# Patient Record
Sex: MALE | Race: BLACK OR AFRICAN AMERICAN
[De-identification: names, ages, dates, MRNs, and addresses within clinical notes are randomized per-mention and may not be internally consistent; named-entity substitution may affect disease eponyms.]

---

## 2017-04-07 ENCOUNTER — HOSPITAL ENCOUNTER (OUTPATIENT)
Dept: HOSPITAL 62 - OD | Age: 57
End: 2017-04-07
Attending: PSYCHIATRY & NEUROLOGY
Payer: COMMERCIAL

## 2017-04-07 DIAGNOSIS — Z79.899: ICD-10-CM

## 2017-04-07 DIAGNOSIS — G40.89: Primary | ICD-10-CM

## 2017-04-07 DIAGNOSIS — R51: ICD-10-CM

## 2017-04-07 LAB
ALBUMIN SERPL-MCNC: 4.4 G/DL (ref 3.5–5)
ALP SERPL-CCNC: 102 U/L (ref 38–126)
ALT SERPL-CCNC: 60 U/L (ref 21–72)
ANION GAP SERPL CALC-SCNC: 13 MMOL/L (ref 5–19)
AST SERPL-CCNC: 39 U/L (ref 17–59)
BILIRUB DIRECT SERPL-MCNC: 0.2 MG/DL (ref 0–0.4)
BILIRUB SERPL-MCNC: 0.5 MG/DL (ref 0.2–1.3)
BUN SERPL-MCNC: 15 MG/DL (ref 7–20)
CALCIUM: 9.5 MG/DL (ref 8.4–10.2)
CHLORIDE SERPL-SCNC: 105 MMOL/L (ref 98–107)
CHOLEST SERPL-MCNC: 221.46 MG/DL (ref 0–200)
CO2 SERPL-SCNC: 28 MMOL/L (ref 22–30)
CREAT SERPL-MCNC: 1.09 MG/DL (ref 0.52–1.25)
DIRECT HDL: 66 MG/DL (ref 40–?)
ERYTHROCYTE [DISTWIDTH] IN BLOOD BY AUTOMATED COUNT: 14.5 % (ref 11.5–14)
GLUCOSE SERPL-MCNC: 88 MG/DL (ref 75–110)
HCT VFR BLD CALC: 41.9 % (ref 37.9–51)
HGB BLD-MCNC: 13.8 G/DL (ref 13.5–17)
HGB HCT DIFFERENCE: -0.5
LDLC SERPL DIRECT ASSAY-MCNC: 117 MG/DL (ref ?–100)
MCH RBC QN AUTO: 29 PG (ref 27–33.4)
MCHC RBC AUTO-ENTMCNC: 33 G/DL (ref 32–36)
MCV RBC AUTO: 88 FL (ref 80–97)
POTASSIUM SERPL-SCNC: 4.5 MMOL/L (ref 3.6–5)
PROT SERPL-MCNC: 7.5 G/DL (ref 6.3–8.2)
RBC # BLD AUTO: 4.76 10^6/UL (ref 4.35–5.55)
SODIUM SERPL-SCNC: 145.8 MMOL/L (ref 137–145)
TRIGL SERPL-MCNC: 81 MG/DL (ref ?–150)
VLDLC SERPL CALC-MCNC: 16 MG/DL (ref 10–31)
WBC # BLD AUTO: 8.4 10^3/UL (ref 4–10.5)

## 2017-04-07 PROCEDURE — 80061 LIPID PANEL: CPT

## 2017-04-07 PROCEDURE — 80053 COMPREHEN METABOLIC PANEL: CPT

## 2017-04-07 PROCEDURE — 85027 COMPLETE CBC AUTOMATED: CPT

## 2017-04-07 PROCEDURE — 36415 COLL VENOUS BLD VENIPUNCTURE: CPT

## 2018-01-23 ENCOUNTER — HOSPITAL ENCOUNTER (EMERGENCY)
Dept: HOSPITAL 62 - ER | Age: 58
Discharge: HOME | End: 2018-01-23
Payer: COMMERCIAL

## 2018-01-23 VITALS — SYSTOLIC BLOOD PRESSURE: 109 MMHG | DIASTOLIC BLOOD PRESSURE: 84 MMHG

## 2018-01-23 DIAGNOSIS — R05: ICD-10-CM

## 2018-01-23 DIAGNOSIS — R09.89: ICD-10-CM

## 2018-01-23 DIAGNOSIS — J40: Primary | ICD-10-CM

## 2018-01-23 LAB
A TYPE INFLUENZA AG: NEGATIVE
ADD MANUAL DIFF: NO
ALBUMIN SERPL-MCNC: 4.8 G/DL (ref 3.5–5)
ALP SERPL-CCNC: 114 U/L (ref 38–126)
ALT SERPL-CCNC: 43 U/L (ref 21–72)
ANION GAP SERPL CALC-SCNC: 14 MMOL/L (ref 5–19)
APPEARANCE UR: CLEAR
APTT PPP: YELLOW S
AST SERPL-CCNC: 34 U/L (ref 17–59)
B INFLUENZA AG: NEGATIVE
BASOPHILS # BLD AUTO: 0.1 10^3/UL (ref 0–0.2)
BASOPHILS NFR BLD AUTO: 1.2 % (ref 0–2)
BILIRUB DIRECT SERPL-MCNC: 0.2 MG/DL (ref 0–0.4)
BILIRUB SERPL-MCNC: 0.3 MG/DL (ref 0.2–1.3)
BILIRUB UR QL STRIP: NEGATIVE
BUN SERPL-MCNC: 11 MG/DL (ref 7–20)
CALCIUM: 9.4 MG/DL (ref 8.4–10.2)
CHLORIDE SERPL-SCNC: 102 MMOL/L (ref 98–107)
CK MB SERPL-MCNC: 0.79 NG/ML (ref ?–4.55)
CK SERPL-CCNC: 902 U/L (ref 55–170)
CO2 SERPL-SCNC: 25 MMOL/L (ref 22–30)
EOSINOPHIL # BLD AUTO: 0.1 10^3/UL (ref 0–0.6)
EOSINOPHIL NFR BLD AUTO: 1.3 % (ref 0–6)
ERYTHROCYTE [DISTWIDTH] IN BLOOD BY AUTOMATED COUNT: 14.2 % (ref 11.5–14)
GLUCOSE SERPL-MCNC: 98 MG/DL (ref 75–110)
GLUCOSE UR STRIP-MCNC: NEGATIVE MG/DL
HCT VFR BLD CALC: 43.1 % (ref 37.9–51)
HGB BLD-MCNC: 14.4 G/DL (ref 13.5–17)
KETONES UR STRIP-MCNC: NEGATIVE MG/DL
LYMPHOCYTES # BLD AUTO: 1.5 10^3/UL (ref 0.5–4.7)
LYMPHOCYTES NFR BLD AUTO: 23.2 % (ref 13–45)
MCH RBC QN AUTO: 29.3 PG (ref 27–33.4)
MCHC RBC AUTO-ENTMCNC: 33.4 G/DL (ref 32–36)
MCV RBC AUTO: 88 FL (ref 80–97)
MONOCYTES # BLD AUTO: 0.8 10^3/UL (ref 0.1–1.4)
MONOCYTES NFR BLD AUTO: 13.1 % (ref 3–13)
NEUTROPHILS # BLD AUTO: 3.9 10^3/UL (ref 1.7–8.2)
NEUTS SEG NFR BLD AUTO: 61.2 % (ref 42–78)
NITRITE UR QL STRIP: NEGATIVE
PH UR STRIP: 7 [PH] (ref 5–9)
PLATELET # BLD: 176 10^3/UL (ref 150–450)
POTASSIUM SERPL-SCNC: 4.3 MMOL/L (ref 3.6–5)
PROT SERPL-MCNC: 7.9 G/DL (ref 6.3–8.2)
PROT UR STRIP-MCNC: NEGATIVE MG/DL
RBC # BLD AUTO: 4.91 10^6/UL (ref 4.35–5.55)
SODIUM SERPL-SCNC: 140.5 MMOL/L (ref 137–145)
SP GR UR STRIP: 1.02
TOTAL CELLS COUNTED % (AUTO): 100 %
TROPONIN I SERPL-MCNC: < 0.012 NG/ML
UROBILINOGEN UR-MCNC: NEGATIVE MG/DL (ref ?–2)
WBC # BLD AUTO: 6.3 10^3/UL (ref 4–10.5)

## 2018-01-23 PROCEDURE — 84484 ASSAY OF TROPONIN QUANT: CPT

## 2018-01-23 PROCEDURE — 81001 URINALYSIS AUTO W/SCOPE: CPT

## 2018-01-23 PROCEDURE — 94640 AIRWAY INHALATION TREATMENT: CPT

## 2018-01-23 PROCEDURE — 93010 ELECTROCARDIOGRAM REPORT: CPT

## 2018-01-23 PROCEDURE — 82550 ASSAY OF CK (CPK): CPT

## 2018-01-23 PROCEDURE — 71046 X-RAY EXAM CHEST 2 VIEWS: CPT

## 2018-01-23 PROCEDURE — 99284 EMERGENCY DEPT VISIT MOD MDM: CPT

## 2018-01-23 PROCEDURE — 82553 CREATINE MB FRACTION: CPT

## 2018-01-23 PROCEDURE — 85025 COMPLETE CBC W/AUTO DIFF WBC: CPT

## 2018-01-23 PROCEDURE — 87804 INFLUENZA ASSAY W/OPTIC: CPT

## 2018-01-23 PROCEDURE — 93005 ELECTROCARDIOGRAM TRACING: CPT

## 2018-01-23 PROCEDURE — 36415 COLL VENOUS BLD VENIPUNCTURE: CPT

## 2018-01-23 PROCEDURE — 80053 COMPREHEN METABOLIC PANEL: CPT

## 2018-01-23 NOTE — RADIOLOGY REPORT (SQ)
EXAM DESCRIPTION:  CHEST PA/LAT



COMPLETED DATE/TIME:  1/23/2018 11:48 am



REASON FOR STUDY:  cp/cough



COMPARISON:  1/29/2016



NUMBER OF VIEWS:  Two view.



TECHNIQUE:  Frontal and lateral radiographic views of the chest acquired.



LIMITATIONS:  None.



FINDINGS:  LUNGS AND PLEURA:  No opacities, masses or pneumothorax. No pleural effusion.

MEDIASTINUM AND HILAR STRUCTURES:  No masses or contour abnormalities.

HEART AND VASCULATURE:  Heart normal size.  No evidence for failure.

BONY STRUCTURES:  No acute findings.

HARDWARE:  None.

OTHER:  No other significant finding.



IMPRESSION:  NO SIGNIFICANT RADIOGRAPHIC FINDING IN THE CHEST.



TECHNICAL DOCUMENTATION:  JOB ID:  [id number]

 2011 [employer]- All Rights Reserved

## 2018-01-23 NOTE — ER DOCUMENT REPORT
ED General





- General


Chief Complaint: Chest Congestion


Stated Complaint: COLD SYMPTOMS


Time Seen by Provider: 01/23/18 10:51


Mode of Arrival: Ambulatory


Information source: Patient


Notes: 





57-year-old male presents with complaints of nonproductive cough of 3 day 

duration.  Patient denies any fevers or chills denies any nausea vomiting or 

diarrhea.  Patient notes that the symptoms are similar to previous viral 

infections in the past.  He denies any nausea vomiting or diarrhea


TRAVEL OUTSIDE OF THE [place] IN LAST 30 DAYS: No





- HPI


Onset: Other - 3 day duration


Onset/Duration: Persistent


Quality of pain: Achy


Severity: Mild


Pain Level: 1


Associated symptoms: Nonproductive cough


Exacerbated by: Denies


Relieved by: Denies


Similar symptoms previously: No


Recently seen / treated by doctor: No





- Related Data


Allergies/Adverse Reactions: 


 





No Known Allergies Allergy (Verified 12/03/15 11:27)


 











Past Medical History





- General


Information source: Patient





- Social History


Smoking Status: Never Smoker


Cigarette use (# per day): No


Chew tobacco use (# tins/day): No


Smoking Education Provided: No


Frequency of alcohol use: None


Drug Abuse: None


Family History: None


Patient has suicidal ideation: No


Patient has homicidal ideation: No





- Past Medical History


Cardiac Medical History: Reports: Hx Hypertension


Neurological Medical History: Reports: Hx Migraine, Hx Seizures


Renal/ Medical History: Denies: Hx Peritoneal Dialysis





- Immunizations


Hx Diphtheria, Pertussis, Tetanus Vaccination: Yes





Review of Systems





- Review of Systems


Notes: 





REVIEW OF SYSTEMS:


CONSTITUTIONAL :  Denies fever,  chills, or sweats.  Denies recent illness.


EENT:   Denies eye, ear, throat, or mouth pain or symptoms.  Denies nasal or 

sinus congestion or discharge.  Denies throat, tongue, or mouth swelling or 

difficulty swallowing.


CARDIOVASCULAR:  Denies chest pain.  Denies palpitations or racing or irregular 

heart beat.  Denies ankle edema.


RESPIRATORY: Admits to cough congestion


GASTROINTESTINAL:  Denies abdominal pain or distention.  Denies nausea, vomiting

, or diarrhea.  Denies blood in vomitus, stools, or per rectum.  Denies black, 

tarry stools.  Denies constipation.  


GENITOURINARY:  Denies difficulty urinating, painful urination, burning, 

frequency, blood in urine, or discharge.


MUSCULOSKELETAL:  Denies back or neck pain or stiffness.  Denies joint pain or 

swelling.


SKIN:   Denies rash, lesions or sores.


HEMATOLOGIC :   Denies easy bruising or bleeding.


LYMPHATIC:  Denies swollen, enlarged glands.


NEUROLOGICAL:  Denies confusion or altered mental status.  Denies passing out 

or loss of consciousness.  Denies dizziness or lightheadedness.  Denies 

headache.  Denies weakness or paralysis or loss of use of either side.  Denies 

problems with gait or speech.  Denies sensory loss, numbness, or tingling.  

Denies seizures.


PSYCHIATRIC:  Denies anxiety or stress.  Denies depression, suicidal ideation, 

or homicidal ideation.





ALL OTHER SYSTEMS REVIEWED AND NEGATIVE.





Dictation was performed using Dragon voice recognition software 





PHYSICAL EXAMINATION:





GENERAL: Well-appearing, well-nourished and in no acute distress.





HEAD: Atraumatic, normocephalic.





EYES: Pupils equal round and reactive to light, extraocular movements intact, 

sclera anicteric, conjunctiva are normal.





ENT: Nares patent, oropharynx clear without exudates.  Moist mucous membranes.





NECK: Normal range of motion, supple without lymphadenopathy





LUNGS: Breath sounds clear to auscultation bilaterally and equal.  No wheezes 

rales or rhonchi.





HEART: Regular rate and rhythm without murmurs





ABDOMEN: Soft, nontender, nondistended abdomen.  No guarding, no rebound.  No 

masses appreciated.





Musculoskeletal: Normal range of motion, no pitting or edema.  No cyanosis.





NEUROLOGICAL: Cranial nerves grossly intact.  Normal speech, normal gait.  

Normal sensory, motor exams 





PSYCH: Normal mood, normal affect.





SKIN: Warm, Dry, normal turgor, no rashes or lesions noted.





Physical Exam





- Vital signs


Vitals: 


 











Temp Pulse Resp BP Pulse Ox


 


 99.4 F   96   18   121/82   98 


 


 01/23/18 09:44  01/23/18 09:44  01/23/18 09:44  01/23/18 09:44  01/23/18 09:44














Course





- Re-evaluation


Re-evalutation: 





01/23/18 15:11


X-ray lab work noted no significant abnormality patient looks extremely well, I 

will discharge the patient home after breathing treatment was given, patient 

notes the breathing treatment did make him feel much better therefore I will 

sent home with an inhaler








After performing a Medical Screening Examination, I estimate there is LOW risk 

for ACUTE CORONARY SYNDROME, PULMONARY EMBOLI, RESPIRATORY FAILURE, SEPSIS OR 

MENINGITIS, thus I consider the discharge disposition reasonable.  I have 

reevaluated this patient multiple times and no significant life threatening 

changes are noted. The patient and I have discussed the diagnosis and risks, 

and we agree with discharging home with close follow-up. We also discussed 

returning to the Emergency Department immediately if new or worsening symptoms 

occur. We have discussed the symptoms which are most concerning (e.g., changing 

or worsening pain, trouble swallowing or breathing, neck stiffness, fever) that 

necessitate immediate return.





- Vital Signs


Vital signs: 


 











Temp Pulse Resp BP Pulse Ox


 


 98.5 F   104 H  18   109/84   93 


 


 01/23/18 13:40  01/23/18 13:40  01/23/18 13:40  01/23/18 13:40  01/23/18 13:40














- Laboratory


Result Diagrams: 


 01/23/18 11:12





 01/23/18 11:12


Laboratory results interpreted by me: 


 











  01/23/18 01/23/18





  11:12 11:12


 


RDW  14.2 H 


 


Monocytes %  13.1 H 


 


Creatinine   1.31 H


 


Est GFR (Non-Af Amer)   57 L


 


Creatine Kinase   902 H














- Diagnostic Test


Radiology reviewed: Image reviewed, Reports reviewed





Discharge





- Discharge


Clinical Impression: 


 Bronchitis





Condition: Stable


Disposition: HOME, SELF-CARE


Instructions:  Bronchitis (Count includes the [facility])


Additional Instructions: 


Follow up with your physician tomorrow for further care or return to the ED 

IMMEDIATELY if symptoms worsen or new concerns occur. If you cannot afford to 

follow up with your primary care physician a list of low cost clinics have been 

provided at the end of your discharge papers as well.

## 2018-01-23 NOTE — ER DOCUMENT REPORT
ED Medical Screen (RME)





- General


Chief Complaint: Chest Congestion


Stated Complaint: COLD SYMPTOMS


Time Seen by Provider: 01/23/18 10:51


Mode of Arrival: Ambulatory


Information source: Patient


Notes: 





Patient is a 57-year-old male who presents to the ER today for cough, 

congestion 3 days and chest pain for approximately half an hour earlier today.

  Patient states that he did not have any coughing when he had the chest pain.  

Patient has no cardiac history.


TRAVEL OUTSIDE OF THE [place] IN LAST 30 DAYS: No





- Related Data


Allergies/Adverse Reactions: 


 





No Known Allergies Allergy (Verified 12/03/15 11:27)


 











Past Medical History





- General


Information source: Patient





- Past Medical History


Cardiac Medical History: Reports: Hx Hypertension


Neurological Medical History: Reports: Hx Migraine, Hx Seizures





- Immunizations


Hx Diphtheria, Pertussis, Tetanus Vaccination: Yes





Review of Systems





- Review of Systems


EENT: See HPI


Respiratory: See HPI





Physical Exam





- Vital signs


Vitals: 





 











Temp Pulse Resp BP Pulse Ox


 


 99.4 F   96   18   121/82   98 


 


 01/23/18 09:44  01/23/18 09:44  01/23/18 09:44  01/23/18 09:44  01/23/18 09:44














- Notes


Notes: 





General: Mildly ill-appearing no acute distress


Respiratory: Cough, otherwise clear to auscultation bilaterally





Course





- Vital Signs


Vital signs: 





 











Temp Pulse Resp BP Pulse Ox


 


 99.4 F   96   18   121/82   98 


 


 01/23/18 09:44  01/23/18 09:44  01/23/18 09:44  01/23/18 09:44  01/23/18 09:44

## 2018-01-23 NOTE — EKG REPORT
SEVERITY:- ABNORMAL ECG -

SINUS RHYTHM

PROBABLE LEFT ATRIAL ABNORMALITY

LEFT VENTRICULAR HYPERTROPHY

:

Confirmed by: Ale Franco MD 23-Jan-2018 13:16:42

## 2018-08-19 ENCOUNTER — HOSPITAL ENCOUNTER (EMERGENCY)
Dept: HOSPITAL 62 - ER | Age: 58
Discharge: HOME | End: 2018-08-19
Payer: COMMERCIAL

## 2018-08-19 VITALS — SYSTOLIC BLOOD PRESSURE: 133 MMHG | DIASTOLIC BLOOD PRESSURE: 96 MMHG

## 2018-08-19 DIAGNOSIS — R51: Primary | ICD-10-CM

## 2018-08-19 DIAGNOSIS — I10: ICD-10-CM

## 2018-08-19 LAB
ADD MANUAL DIFF: NO
ALBUMIN SERPL-MCNC: 4.3 G/DL (ref 3.5–5)
ALP SERPL-CCNC: 103 U/L (ref 38–126)
ALT SERPL-CCNC: 31 U/L (ref 21–72)
ANION GAP SERPL CALC-SCNC: 13 MMOL/L (ref 5–19)
AST SERPL-CCNC: 24 U/L (ref 17–59)
BASOPHILS # BLD AUTO: 0.1 10^3/UL (ref 0–0.2)
BASOPHILS NFR BLD AUTO: 1.2 % (ref 0–2)
BILIRUB DIRECT SERPL-MCNC: 0.3 MG/DL (ref 0–0.4)
BILIRUB SERPL-MCNC: 0.6 MG/DL (ref 0.2–1.3)
BUN SERPL-MCNC: 11 MG/DL (ref 7–20)
CALCIUM: 9.4 MG/DL (ref 8.4–10.2)
CHLORIDE SERPL-SCNC: 107 MMOL/L (ref 98–107)
CO2 SERPL-SCNC: 26 MMOL/L (ref 22–30)
EOSINOPHIL # BLD AUTO: 0.3 10^3/UL (ref 0–0.6)
EOSINOPHIL NFR BLD AUTO: 3.8 % (ref 0–6)
ERYTHROCYTE [DISTWIDTH] IN BLOOD BY AUTOMATED COUNT: 14.7 % (ref 11.5–14)
GLUCOSE SERPL-MCNC: 91 MG/DL (ref 75–110)
HCT VFR BLD CALC: 43.6 % (ref 37.9–51)
HGB BLD-MCNC: 14.6 G/DL (ref 13.5–17)
LYMPHOCYTES # BLD AUTO: 2.1 10^3/UL (ref 0.5–4.7)
LYMPHOCYTES NFR BLD AUTO: 30.6 % (ref 13–45)
MCH RBC QN AUTO: 29.8 PG (ref 27–33.4)
MCHC RBC AUTO-ENTMCNC: 33.6 G/DL (ref 32–36)
MCV RBC AUTO: 89 FL (ref 80–97)
MONOCYTES # BLD AUTO: 0.6 10^3/UL (ref 0.1–1.4)
MONOCYTES NFR BLD AUTO: 8.5 % (ref 3–13)
NEUTROPHILS # BLD AUTO: 3.8 10^3/UL (ref 1.7–8.2)
NEUTS SEG NFR BLD AUTO: 55.9 % (ref 42–78)
PLATELET # BLD: 171 10^3/UL (ref 150–450)
POTASSIUM SERPL-SCNC: 3.9 MMOL/L (ref 3.6–5)
PROT SERPL-MCNC: 7.7 G/DL (ref 6.3–8.2)
RBC # BLD AUTO: 4.91 10^6/UL (ref 4.35–5.55)
SODIUM SERPL-SCNC: 146 MMOL/L (ref 137–145)
TOTAL CELLS COUNTED % (AUTO): 100 %
WBC # BLD AUTO: 6.9 10^3/UL (ref 4–10.5)

## 2018-08-19 PROCEDURE — 85025 COMPLETE CBC W/AUTO DIFF WBC: CPT

## 2018-08-19 PROCEDURE — 99283 EMERGENCY DEPT VISIT LOW MDM: CPT

## 2018-08-19 PROCEDURE — 96374 THER/PROPH/DIAG INJ IV PUSH: CPT

## 2018-08-19 PROCEDURE — 96361 HYDRATE IV INFUSION ADD-ON: CPT

## 2018-08-19 PROCEDURE — 36415 COLL VENOUS BLD VENIPUNCTURE: CPT

## 2018-08-19 PROCEDURE — 80053 COMPREHEN METABOLIC PANEL: CPT

## 2018-08-19 PROCEDURE — 96375 TX/PRO/DX INJ NEW DRUG ADDON: CPT

## 2018-08-19 NOTE — ER DOCUMENT REPORT
ED Medical Screen (RME)





- General


Chief Complaint: Headache


Stated Complaint: HEADACHE


Time Seen by Provider: 08/19/18 09:17


Mode of Arrival: Ambulatory


Information source: Patient


Notes: 





This is a 58-year-old man who has a history of hypertension, migraines, 

seizures who presents to the emergency room with right-sided headache since 

yesterday.  Patient states that it is always on the right side.  It is a 

typical migraine for him it is just worse than it normally is.  It is not the 

worst headache of his life.





Medications include losartan 100 mg daily, Aptiom 1200 mg nightly, topiramate 

150 mg nightly, amlodipine 5 mg daily


Amitriptyline 75 mg nightly, fish oil, ibuprofen


TRAVEL OUTSIDE OF THE [place] IN LAST 30 DAYS: No





- Related Data


Allergies/Adverse Reactions: 


 





No Known Allergies Allergy (Verified 08/19/18 08:46)


 











Past Medical History





- Social History


Frequency of alcohol use: None


Drug Abuse: None





- Past Medical History


Cardiac Medical History: Reports: Hx Hypertension


Neurological Medical History: Reports: Hx Migraine, Hx Seizures


Renal/ Medical History: Denies: Hx Peritoneal Dialysis





- Immunizations


Hx Diphtheria, Pertussis, Tetanus Vaccination: Yes





Physical Exam





- Vital signs


Vitals: 


 











Temp Pulse Resp BP Pulse Ox


 


 98.2 F   89   16   133/96 H  97 


 


 08/19/18 08:46  08/19/18 08:46  08/19/18 08:46  08/19/18 08:46  08/19/18 08:46














Course





- Vital Signs


Vital signs: 


 











Temp Pulse Resp BP Pulse Ox


 


 98.2 F   89   16   133/96 H  97 


 


 08/19/18 08:46  08/19/18 08:46  08/19/18 08:46  08/19/18 08:46  08/19/18 08:46

## 2018-08-19 NOTE — ER DOCUMENT REPORT
ED General





- General


Chief Complaint: Headache


Stated Complaint: HEADACHE


Time Seen by Provider: 08/19/18 09:17


Mode of Arrival: Ambulatory


TRAVEL OUTSIDE OF THE [place] IN LAST 30 DAYS: No





- HPI


Patient complains to provider of: Headache


Notes: 





Patient here for evaluation of headache.  States ongoing for greater than 12 

hours.  Patient has a history of migraines to similar to his migraines in the 

past.  Patient also has a history of seizure disorder and hypertension.  

Patient is compliant with all of his medications.  Patient denies any trauma.  

Denies any photophobia nausea vomiting fevers or chills.  Upon my initial 

evaluation patient sleeping easily arousable





- Related Data


Allergies/Adverse Reactions: 


 





No Known Allergies Allergy (Verified 08/19/18 08:46)


 











Past Medical History





- General


Information source: Patient





- Social History


Smoking Status: Never Smoker


Frequency of alcohol use: None


Drug Abuse: None


Family History: None


Patient has suicidal ideation: No


Patient has homicidal ideation: No





- Past Medical History


Cardiac Medical History: Reports: Hx Hypertension


Neurological Medical History: Reports: Hx Migraine, Hx Seizures


Renal/ Medical History: Denies: Hx Peritoneal Dialysis





- Immunizations


Hx Diphtheria, Pertussis, Tetanus Vaccination: Yes





Review of Systems





- Review of Systems


Constitutional: No symptoms reported


EENT: No symptoms reported


Cardiovascular: No symptoms reported


Respiratory: No symptoms reported


Gastrointestinal: No symptoms reported


Genitourinary: No symptoms reported


Male Genitourinary: No symptoms reported


Musculoskeletal: No symptoms reported


Skin: No symptoms reported


Hematologic/Lymphatic: No symptoms reported


Neurological/Psychological: Headaches


-: Yes All other systems reviewed and negative





Physical Exam





- Vital signs


Vitals: 


 











Temp Pulse Resp BP Pulse Ox


 


 98.2 F   89   16   133/96 H  97 


 


 08/19/18 08:46  08/19/18 08:46  08/19/18 08:46  08/19/18 08:46  08/19/18 08:46











Interpretation: Normal





- General


General appearance: Appears well, Alert





- HEENT


Head: Normocephalic, Atraumatic


Eyes: Normal


Pupils: PERRL





- Respiratory


Respiratory status: No respiratory distress


Chest status: Nontender


Breath sounds: Normal


Chest palpation: Normal





- Cardiovascular


Rhythm: Regular


Heart sounds: Normal auscultation


Murmur: No





- Abdominal


Inspection: Normal


Distension: No distension


Bowel sounds: Normal


Tenderness: Nontender


Organomegaly: No organomegaly





- Back


Back: Normal, Nontender





- Extremities


General upper extremity: Normal inspection, Nontender, Normal color, Normal ROM

, Normal temperature


General lower extremity: Normal inspection, Nontender, Normal color, Normal ROM

, Normal temperature, Normal weight bearing.  No: Sulma's sign





- Neurological


Neuro grossly intact: Yes


Cognition: Normal


Orientation: AAOx4


Aparna Coma Scale Eye Opening: Spontaneous


Aparna Coma Scale Verbal: Oriented


[place] Coma Scale Motor: Obeys Commands


Aparna Coma Scale Total: 15


Speech: Normal


Motor strength normal: LUE, RUE, LLE, RLE


Sensory: Normal





- Psychological


Associated symptoms: Normal affect, Normal mood





- Skin


Skin Temperature: Warm


Skin Moisture: Dry


Skin Color: Normal





Course





- Re-evaluation


Re-evalutation: 





08/19/18 14:39


The patient presents with headache without signs of CNS bleed, stroke, infection

, or other serious etiology. The patient is neurologically intact. Given the 

extremely low risk of these diagnoses further testing and evaluation for these 

possibilities does not appear to be indicated at this time. The patient has 

been instructed to return if the symptoms worsen or change in any way..





- Vital Signs


Vital signs: 


 











Temp Pulse Resp BP Pulse Ox


 


 98.2 F   89   16   133/96 H  97 


 


 08/19/18 08:46  08/19/18 08:46  08/19/18 08:46  08/19/18 08:46  08/19/18 08:46














- Laboratory


Result Diagrams: 


 08/19/18 09:30





 08/19/18 09:30


Laboratory results interpreted by me: 


 











  08/19/18 08/19/18





  09:30 09:30


 


RDW  14.7 H 


 


Sodium   146.0 H














Discharge





- Discharge


Clinical Impression: 


Headache


Qualifiers:


 Headache type: unspecified Headache chronicity pattern: unspecified pattern 

Intractability: not intractable Qualified Code(s): R51 - Headache





Condition: Good


Disposition: HOME, SELF-CARE


Instructions:  Headache ([facility])


Additional Instructions: 


Laboratory studies are suggestive of dehydration today.  Please make sure you 

are drinking plenty water to stay well-hydrated.  Continue your home 

medications as prescribed.  Follow-up with your primary care physician for 

further evaluation.


Referrals: 


ADAM CLIFTON,  [Primary Care Provider] - Follow up as needed

## 2019-03-25 NOTE — ER DOCUMENT REPORT
ED General





- General


Chief Complaint: Headache


Stated Complaint: HEADACHE


Time Seen by Provider: 03/25/19 16:07


Primary Care Provider: 


ADAM CLIFTON DO [Primary Care Provider] - Follow up as needed


Mode of Arrival: Ambulatory


Information source: Patient, Relative, [facility] Records


Notes: 





58-year-old male with migraine, seizure disorder presents with complaint of 

left-sided headache that started 1 day prior to arrival.  Patient describes the 

pain as throbbing, stabbing and similar to his previous migraine headaches.  

Patient did take ibuprofen 800 mg with minimal relief.  Patient has had 

associated nausea without vomiting he admits to light sensitivity.  Patient is 

currently under the care of a neurologist for his seizure disorder and 

headaches.  Patient denies any recent head injury, fever, chills, nasal 

congestion, ear pain, slurred speech, difficulty with ambulation.  Patient 

reports that his last seizure was earlier today.  He characterizes his seizures 

as staring off.


TRAVEL OUTSIDE OF THE [place] IN LAST 30 DAYS: No





- HPI


Onset: Yesterday


Onset/Duration: Intermittent, Waxing and waning


Quality of pain: Throbbing


Severity: Moderate


Associated symptoms: Headache, Nausea.  denies: Body/muscle aches, Chest pain, 

Fever, Vomiting, Rhinnorhea, Sinus pain/drainage, Sore throat


Exacerbated by: Denies


Relieved by: Other - Ibuprofen


Similar symptoms previously: Yes


Recently seen / treated by doctor: Yes





- Related Data


Allergies/Adverse Reactions: 


                                        





No Known Allergies Allergy (Verified 03/25/19 16:05)


   











Past Medical History





- General


Information source: Patient





- Social History


Smoking Status: Never Smoker


Chew tobacco use (# tins/day): No


Frequency of alcohol use: None


Drug Abuse: None


Lives with: Spouse/Significant other


Family History: None


Patient has suicidal ideation: No


Patient has homicidal ideation: No





- Past Medical History


Cardiac Medical History: Reports: Hx Hypertension


Neurological Medical History: Reports: Hx Migraine, Hx Seizures


Renal/ Medical History: Denies: Hx Peritoneal Dialysis





- Immunizations


Hx Diphtheria, Pertussis, Tetanus Vaccination: Yes





Review of Systems





- Review of Systems


Notes: 





REVIEW OF SYSTEMS:


CONSTITUTIONAL :  Denies fever,  chills, or sweats.  Denies recent illness. 

Denies weight loss, recent hospitalizations. 


EENT: Denies visual changes, eye pain.  Denies sore throat, oral lesions, 

difficulty swallowing.


CARDIOVASCULAR:  Denies chest pain.  Denies palpitations. Denies lower extremity

edema.


RESPIRATORY:  Denies cough. Denies shortness of breath, wheezing.


GASTROINTESTINAL:  Denies abdominal pain or distention.  Denies vomiting, or 

diarrhea.  Denies blood in vomitus, stools, or per rectum.  Denies black, tarry 

stools.  Denies constipation.  


GENITOURINARY:  Denies difficulty urinating, painful urination,  frequency, 

blood in urine, testicular pain or penile discharge.


MUSCULOSKELETAL:  Denies back or neck pain or stiffness.  Denies joint pain or 

swelling.


SKIN:   Denies rash, lesions or sores.


HEMATOLOGIC :   Denies easy bruising or bleeding.


LYMPHATIC:  Denies swollen glands.


NEUROLOGICAL:  Denies confusion or altered mental status.  Denies loss of 

consciousness.  Denies dizziness or lightheadedness. Denies weakness or 

paralysis.  Denies problems difficulty with ambulation, slurred speech.  Denies 

sensory loss, numbness, or tingling.  


PSYCHIATRIC:  Denies anxiety or stress.  Denies depression, suicidal ideation, 

or





Physical Exam





- Vital signs


Vitals: 


                                        











Temp Pulse Resp BP Pulse Ox


 


 97.4 F   91   16   147/108 H  97 


 


 03/25/19 16:07  03/25/19 16:07  03/25/19 16:07  03/25/19 16:07  03/25/19 16:07














- Notes


Notes: 





PHYSICAL EXAMINATION:





GENERAL: Well-appearing, well-nourished and in no acute distress.





HEAD: Atraumatic, normocephalic.





EYES: Pupils equal round and reactive to light, extraocular movements intact, 

sclera anicteric, conjunctiva are normal.  No temporal artery tenderness.  

Unremarkable funduscopic exam.





ENT: Nares patent, oropharynx clear without exudates.  Moist mucous membranes.





NECK: Normal range of motion, supple without lymphadenopathy





LUNGS: Breath sounds clear to auscultation bilaterally and equal.  No wheezes 

rales or rhonchi.





HEART: Regular rate and rhythm without murmurs





ABDOMEN: Soft, nontender, nondistended abdomen.  No guarding, no rebound.  No 

masses appreciated.





Musculoskeletal: Normal range of motion, no pitting or edema.  No cyanosis.





NEUROLOGICAL: Cranial nerves grossly intact.  Normal speech, normal gait.  

Normal sensory, motor exams.  NIH 0





PSYCH: Normal mood, normal affect.





SKIN: Warm, Dry, normal turgor, no rashes or lesions noted.





Course





- Re-evaluation


Re-evalutation: 


58-year-old male with migraine, seizure disorder presents with complaint of 

left-sided headache that started 1 day prior to arrival.  Patient describes the 

pain as throbbing, stabbing and similar to his previous migraine headaches.  

Patient did take ibuprofen 800 mg with minimal relief.  Patient has had 

associated nausea without vomiting he admits to light sensitivity.  Vital signs 

reviewed upon arrival.  Patient is hypertensive but admits to not taking his 

blood pressure medication today.  Patient does not appear toxic or dehydrated.  

He is in no acute distress.  Patient has a normal neurologic exam.  Patient 

reports improvement of his headache after receiving p.o. Reglan, Benadryl and 

Ativan.


03/25/19 21:19


Patient does have elevated blood pressure but admits to not taking his blood 

pressure medication yet today.  Declining administration of his home medication 

here states that he will take it when he gets home.


03/25/19 22:06


Dictation on this chart was performed using voice recognition software and may 

result in unintended grammatical, spelling, syntax or errors.








- Vital Signs


Vital signs: 


                                        











Temp Pulse Resp BP Pulse Ox


 


 97.6 F   83   16   128/106 H  95 


 


 03/25/19 21:17  03/25/19 21:17  03/25/19 21:17  03/25/19 21:17  03/25/19 21:17














Discharge





- Discharge


Clinical Impression: 


 History of migraine





Headache


Qualifiers:


 Headache type: unspecified Headache chronicity pattern: unspecified pattern 

Intractability: not intractable Qualified Code(s): R51 - Headache





Condition: Good


Disposition: HOME, SELF-CARE


Instructions:  Use of Diphenhydramine, Headache ([facility]), Reglan ([facility])


Additional Instructions: 


You have been seen in the Emergency Department (ED) for a headache.  Please use 

Tylenol (acetaminophen) or Motrin (ibuprofen) as needed for symptoms, but only 

as written on the box. 





As we have discussed, please follow up with your primary care doctor as soon as 

possible regarding today's ED visit and your headache symptoms.  





Call your doctor or return to the ED if you have a worsening headache, sudden 

and severe headache, confusion, slurred speech, facial droop, weakness or 

numbness in any arm or leg, extreme fatigue, or other symptoms that concern you.





Follow up with your [id number] hours  for further care or return to the ED

IMMEDIATELY if symptoms worsen or you have any concerns.  If you cannot afford 

to follow up with your primary care physician a list of low cost clinics have be

en provided at the end of your discharge papers as well.











Most prescribed medications have multiple side effects.  The safest thing to do 

is when filling your prescription  speak to your pharmacist regarding possible 

interactions with your normal home medications and over the counter medications 

such as Ibuprofen, Tylenol, Benadryl.  If you experience any symptoms that cause

you discomfort or concern you should discontinue the medication immediately and 

return to the emergency room or call your primary care physician.


Forms:  Elevated Blood Pressure


Referrals: 


ADAM CLIFTON,  [Primary Care Provider] - Follow up as needed

## 2019-03-25 NOTE — ER DOCUMENT REPORT
ED Medical Screen (RME)





- General


Chief Complaint: Headache


Stated Complaint: HEADACHE


Time Seen by Provider: 03/25/19 16:07


Primary Care Provider: 


ADAM CLIFTON DO [Primary Care Provider] - Follow up as needed


Mode of Arrival: Ambulatory


Information source: Patient


Notes: 





PT PRESENTS TO THE ED WITH TYPICAL MIGRAINE HERRERA. REPORTS HERRERA IS AT THE BACK OF HIS

HEAD.  REPORTS SOMETIMES HE IS UNABLE TO TALK WHEN HE HAS THE MIGRAINE, THIS IS 

TYPICAL FOR HIM. HX OF SEIZURES, REPORTS IN THE PAST HE WOULD HAVE A MIGRAINE 

AND THEN SEIZURE WHEN HE WAS YOUNGER.  DENIES SEIZURES NOW, NONE RECENTLY. 

DENIES TRAUMA, A&O, NO OBVIOUS NEURO DEFICIT, SPEAKING IN CLEAR VOICE, NO 

WEAKNESS.  TOOK HIS MIGRAINE MED WITHOUT RELIEF OF SYMPTOMS. 








1830


UPON REASSESSMENT PT REPORTS HIS HEADACHE WAS BETTER,








I have greeted and performed a rapid initial assessment of this patient.  A 

comprehensive ED assessment and evaluation of the patient, analysis of test 

results and completion of the medical decision making process will be conducted 

by additional ED providers.


TRAVEL OUTSIDE OF THE [place] IN LAST 30 DAYS: No





- Related Data


Allergies/Adverse Reactions: 


                                        





No Known Allergies Allergy (Verified 03/25/19 16:05)


   











Past Medical History





- Past Medical History


Cardiac Medical History: Reports: Hx Hypertension


Neurological Medical History: Reports: Hx Migraine, Hx Seizures


Renal/ Medical History: Denies: Hx Peritoneal Dialysis





- Immunizations


Hx Diphtheria, Pertussis, Tetanus Vaccination: Yes





Physical Exam





- Vital signs


Vitals: 


                                        











Temp Pulse Resp BP Pulse Ox


 


 97.4 F   91   16   147/108 H  97 


 


 03/25/19 16:07  03/25/19 16:07  03/25/19 16:07  03/25/19 16:07  03/25/19 16:07














Course





- Vital Signs


Vital signs: 


                                        











Temp Pulse Resp BP Pulse Ox


 


 97.4 F   91   16   147/108 H  97 


 


 03/25/19 16:07  03/25/19 16:07  03/25/19 16:07  03/25/19 16:07  03/25/19 16:07














Doctor's Discharge





- Discharge


Referrals: 


ADAM CLIFTON DO [Primary Care Provider] - Follow up as needed

## 2019-04-18 NOTE — RADIOLOGY REPORT (SQ)
EXAM DESCRIPTION:  U/S THYROID/SFT TISS HD   NECK



COMPLETED DATE/TIME:  4/17/2019 5:59 pm



REASON FOR STUDY:  HYPERTHYROID



COMPARISON:  None.



TECHNIQUE:  Dynamic and static gray-scale images acquired of the thyroid gland. Selected additional c
olor/power Doppler images recorded.  All images stored to PACS.



LIMITATIONS:  None.



FINDINGS:  RIGHT LOBE: Normal size, 4.1 x 1.3 x 1.5 cm.  Homogeneous echotexture.  No cystic or solid
 masses.

LEFT LOBE: Normal size, 3.2 x 1.3 x 1.1 cm.  Homogeneous echotexture.  No cystic or solid masses.

ISTHMUS: Normal size.  Homogeneous echotexture.  No cystic or solid masses.

OTHER: No other significant finding.



IMPRESSION:  NORMAL THYROID ULTRASOUND.



TECHNICAL DOCUMENTATION:  JOB ID:  [id number]

 2011 [employer]- All Rights Reserved



Reading location - IP/workstation name: [email or web address]

## 2021-01-02 NOTE — ER RDC ASSESSMENT REPORT
Intake





- In the Last 14 days


Have you traveled outside [place]?: No


Have you been in close contact with someone CONFIRMED: Yes


Worked in Healthcare?: No





- Symptoms


Subjective Fever(Felt feverish): No


Chills: No


Muscule Aches: No


Runny Nose: No


Sore Throat: No


Cough (New or worsening chronic cough): No


Shortness of breath: No


Nausea or Vomiting: No


Headache: No


Abdominal Pain: No


Diarrhea(3 or more loose stools in last 24 hours): No





- Do you have any of the following


Chronic lung disease: Asthma or emphysema or COPD: No


Cystic Fibrosis: No


Diabetes: Yes


Diabetes Comment: 


Patient reports he is prediabetic.





High Blood Pressure: No


Cardiovascular Disease: No


Chronic Kidney Disease: No


Chronic Liver Disease: No


Chronic blood disorder like Sickle Cell Disease: No


Weak immune system due to disease or medication: No


Neurologic condition that limits movement: Yes


Neurological Condition Comment: 


She reports history of epilepsy.





Developmental delay - Moderate to Severe: No


Recent (within past 2 weeks) or current Pregnancy: No


Morbid Obesity (>100 pounds over ideal weight): No





- Objective


Temperature: 98.1 F


Pulse Rate: 96


Respiratory Rate: 18


Blood Pressure: 129/81


O2 Sat by Pulse Oximetry: 95


Objective: 


Patient is a well-appearing 60-year-old male, who presents today for COVID-19 

screening.








Disposition: Home; Selfcare





General





- General


Stated Complaint: COVID-19 screening


Mode of Arrival: Ambulatory


Information source: Patient


Notes: 


The patient was evaluated during the global COVID-19 pandemic. That diagnosis 

was suspected/considered upon initial presentation. Their evaluation, treatment,

and testing was consistent with current guidelines for patients who present with

complaints or symptoms that may be related to COVID-19.





Patient reports having close contact exposure to a COVID-19 lab confirmed 

positive individual.








- HPI


Patient complains to provider of: Asymptomatic, no complaint


Quality of pain: No pain


Severity: None


Pain Level: Denies


Associated symptoms: None


Exacerbated by: Denies


Relieved by: Denies


Similar symptoms previously: No


Recently seen / treated by doctor: No





- Related Data


Allergies/Adverse Reactions: 


                                        





No Known Allergies Allergy (Verified 03/25/19 16:05)


   











Past Medical History





- General


Information source: Patient





- Social History


Smoking Status: Never Smoker


Cigarette use (# per day): No


Chew tobacco use (# tins/day): No


Smoking Education Provided: No


Frequency of alcohol use: Occasional


Drug Abuse: None


Occupation: Retired


Lives with: Family


Family History: None


Patient has suicidal ideation: No


Patient has homicidal ideation: No





- Past Medical History


Cardiac Medical History: Reports: Hx Hypertension


Neurological Medical History: Reports: Hx Migraine, Hx Seizures


Renal/ Medical History: Denies: Hx Peritoneal Dialysis





Physical Exam





- General


General appearance: Appears well


In distress: None


Notes: 


PHYSICAL EXAMINATION: 


GENERAL: Well-appearing with No Acute Distress noted.  


HEAD: Atraumatic, Normocephalic. 


EYES: Sclera anicteric, Conjunctiva are pink and moist. 


ENT: Nares patent. Moist mucous membranes. 


NECK: Normal range of motion, supple without lymphadenopathy. 


LUNGS: CTAB and equal. No wheezes rales or rhonchi. 


HEART: Regular rate and rhythm without murmurs. 


ABDOMEN: Soft, nontender, normal bowel sounds, no guarding. 


EXTREMITIES: Normal range of motion, no pitting edema. No cyanosis. 


BACK: No midline or CVA tenderness. No step-off or deformity. 


NEUROLOGICAL: Cranial nerves grossly intact. Normal speech. Normal gait.


PSYCH: Calm, Cooperative, and answers questions appropriately. Normal mood and 

affect.


SKIN: Warm, Dry, Normal color and Turgor, No obvious lesions or rash noted.











Diagnostic Results


Laboratory Results: 


Patient advised at this time they are considered a Person Under Investigation 

(PUI) for the COVID-19 Coronavirus. They have been made aware it is currently 

taking 3 to 5 days to receive their results. Patient advised The [facility] Department will call to notify them of a POSITIVE result, and an [facility] team member will call to notify them of a NEGATIVE result.





Patient Education/Counseling


Counseling/Education: 


Patient presents with upper respiratory symptoms worrisome for possible COVID-

19.  Patient does not have symptoms worrisome as an emergency such as difficulty

breathing, shortness of breath, chest pain, pressure, confusion or cyanosis.  

Patient appears suitable for discharge.  Patient's vital signs are stable and 

patient is nontoxic in appearance.  Good return precautions have been discussed 

with patient, patient verbalized understanding and is agreeable with discharge 

plan of care at this time.





Patient provided COVID-19 discharge instructions to include:





As a person under investigation for COVID-19, the [place] department of 

Health and Human Services, division of public health advises you to adhere to 

the following guidance until your test results are reported to you.  If your 

test result is positive, you will receive additional information from your 

provider and your local health department at that time.


 


Remain at home until you are cleared by the health provider or public health 

authorities.


 


Keep a log of visitors to your home, notify any visitors to your home of your 

isolation status.


 


If you plan to move to a new address or leave the county, notify the local 

health department in your County.


 


Call your doctor or seek care if you have an urgent medical need.  Before 

seeking medical care, call ahead to get instructions from the provider before 

arriving at the medical office clinic or hospital.  Notify them that you are 

being tested for the virus that causes COVID-19 so that arrangements can be 

made, as necessary, to prevent transmission to others in the healthcare setting.

 Next, notify the local health department in your county.


 


If a medical emergency arises and you need to call 911, inform dispatch and the 

first responders that you are being tested for the virus that causes COVID-19.  

Next, notify the local health department in your county.





Guidance for worsening S/SX: 


For worsening symptoms, patient has been advised to contact their Primary Care 

Provider, or go to the nearest Emergency Department.








RDC Discharge





- Discharge


Clinical Impression: 


 COVID-19 Screening





URI (upper respiratory infection)


Qualifiers:


 URI type: unspecified URI Qualified Code(s): J06.9 - Acute upper respiratory 

infection, unspecified





Condition: Stable


Disposition: Home; Selfcare